# Patient Record
Sex: MALE | Race: WHITE | URBAN - METROPOLITAN AREA
[De-identification: names, ages, dates, MRNs, and addresses within clinical notes are randomized per-mention and may not be internally consistent; named-entity substitution may affect disease eponyms.]

---

## 2017-04-18 ENCOUNTER — HOSPITAL ENCOUNTER (OUTPATIENT)
Dept: VASCULAR LAB | Age: 32
Discharge: HOME | End: 2017-04-18
Attending: NURSE PRACTITIONER | Admitting: NURSE PRACTITIONER

## 2017-04-18 DIAGNOSIS — M79.661 PAIN OF RIGHT LOWER LEG: ICD-10-CM

## 2017-10-02 ENCOUNTER — OFFICE VISIT (OUTPATIENT)
Dept: URGENT CARE | Age: 32
End: 2017-10-02

## 2017-10-02 VITALS
HEART RATE: 70 BPM | SYSTOLIC BLOOD PRESSURE: 122 MMHG | DIASTOLIC BLOOD PRESSURE: 90 MMHG | TEMPERATURE: 98.8 F | HEIGHT: 71 IN | OXYGEN SATURATION: 98 % | RESPIRATION RATE: 14 BRPM | BODY MASS INDEX: 34.3 KG/M2 | WEIGHT: 245 LBS

## 2017-10-02 DIAGNOSIS — R07.9 CHEST PAIN, UNSPECIFIED TYPE: Primary | ICD-10-CM

## 2017-10-02 PROCEDURE — 99214 OFFICE O/P EST MOD 30 MIN: CPT | Performed by: EMERGENCY MEDICINE

## 2017-10-02 ASSESSMENT — ENCOUNTER SYMPTOMS
DIARRHEA: 0
WHEEZING: 0
COUGH: 0
SHORTNESS OF BREATH: 0
ABDOMINAL PAIN: 0
RHINORRHEA: 0
TROUBLE SWALLOWING: 0
HEMOPTYSIS: 0
SORE THROAT: 0
EYES NEGATIVE: 1
EYE PAIN: 0
BACK PAIN: 0
NAUSEA: 1
VOMITING: 0
EYE DISCHARGE: 0

## 2017-10-02 NOTE — PATIENT INSTRUCTIONS
Go to the Emergency Department at [facility] for further evaluation and treatment

## 2017-10-02 NOTE — MR AVS SNAPSHOT
After Visit Summary             Rufus Montero   10/2/2017 1:05 PM   Office Visit    Description:  Male : 1985   Provider:  Deisy Mcfadden MD   Department:  [facility]              Your Follow-Up and Future Appointments         Below is a list of your follow-up and future appointments. This may not be a complete list as you may have made appointments directly with providers that we are not aware of or your providers may have made some for you. Please call your providers to confirm appointments. It is important to keep your appointments. Please bring your current insurance card, photo ID, co-pay, and all medication bottles to your appointment. If self-pay, payment is expected at the time of service. Information from Your Visit        Department     Name Address Phone Fax    [facility] [address] [address],[address] [phone]      You Were Seen for:         Comments    Chest pain, unspecified type   [[id number]]         Vital Signs     Blood Pressure Pulse Temperature Respirations Height Weight    122/90 (Site: Left Arm, Position: Sitting, Cuff Size: Small Adult) 70 98.8 °F (37.1 °C) (Oral) 14 5' 11\" (1.803 m) 245 lb (111.1 kg)    Oxygen Saturation Body Mass Index Smoking Status             98% 34.17 kg/m2 Former Smoker         Additional Information about your Body Mass Index (BMI)           Your BMI as listed above is considered obese (30 or more). BMI is an estimate of body fat, calculated from your height and weight. The higher your BMI, the greater your risk of heart disease, high blood pressure, type 2 diabetes, stroke, gallstones, arthritis, sleep apnea, and certain cancers. BMI is not perfect. It may overestimate body fat in athletes and people who are more muscular.   Even a small weight loss (between 5 and 10 percent of your current weight) by decreasing your calorie intake and becoming more physically active will help lower your risk of developing or worsening diseases associated with obesity. Learn more at: [email or web address].uk          Instructions    Go to the Emergency Department at [facility] for further evaluation and treatment            Today's Medication Changes          These changes are accurate as of: 10/2/17  1:30 PM.  If you have any questions, ask your nurse or doctor. CHANGE how you take these medications           * apixaban 5 MG Tabs tablet   Commonly known as:  ELIQUIS   Instructions: Take 5 mg by mouth   Refills:  0   What changed:  Another medication with the same name was changed. Make sure you understand how and when to take each. * apixaban 5 MG Tabs tablet   Commonly known as:  ELIQUIS   Instructions: Take 2 tablets by mouth 2 times daily Please take 10 mg twice a day for 7 days and then decrease to 5 mg twice a day. Quantity:  60 tablet   Refills:  0   What changed:    - how much to take  - additional instructions       * Notice: This list has 2 medication(s) that are the same as other medications prescribed for you. Read the directions carefully, and ask your doctor or other care provider to review them with you.             Your Current Medications Are              carvedilol (COREG) 25 MG tablet Take 37.5 mg by mouth 2 times daily (with meals)    lisinopril (PRINIVIL;ZESTRIL) 10 MG tablet Take 10 mg by mouth daily    furosemide (LASIX) 20 MG tablet Take 20 mg by mouth daily    potassium chloride (KLOR-CON M) 20 MEQ extended release tablet Take 20 mEq by mouth 2 times daily    sertraline (ZOLOFT) 100 MG tablet Take 200 mg by mouth    apixaban (ELIQUIS) 5 MG TABS tablet Take 5 mg by mouth    sertraline (ZOLOFT) 25 MG tablet Take 250 mg by mouth    apixaban (ELIQUIS) 5 MG TABS tablet Take 2 tablets by mouth 2 times daily Please take 10 mg twice a day for 7 days and then decrease to 5 mg twice a

## 2017-10-02 NOTE — PROGRESS NOTES
Reason for Visit:   Chief Complaint   Patient presents with    Respiratory Distress     pt. states that they may have plueresy after seeing his pulmanologist about 4 weeks ago and his chest has gotten worse      Patient History     HPI:    Chest Pain    This is a new problem. Episode onset: x 4 weeks. The problem occurs intermittently. The problem has been gradually worsening. The pain is present in the substernal region and lateral region (left). The pain is at a severity of 8/10. The quality of the pain is described as sharp. The pain does not radiate. Associated symptoms include nausea. Pertinent negatives include no abdominal pain, back pain, cough, exertional chest pressure, fever, headaches, hemoptysis, irregular heartbeat, leg pain, palpitations, shortness of breath, syncope or vomiting. The pain is aggravated by nothing. Treatments tried: Prednisone, NSAIDS. The treatment provided no relief. Risk factors: history of PE. Reports adherence to medication regimen, including Eliquis. Past Medical History:   Diagnosis Date    ADHD (attention deficit hyperactivity disorder)     CHF [facility]congestive heart failure) ([facility])     Congenital factor IX disorder (HCC)     Depression     OCD (obsessive compulsive disorder)     PTSD (post-traumatic stress disorder)        Past Surgical History:   Procedure Laterality Date    CARPAL TUNNEL RELEASE      right    WRIST SURGERY      ganglion cyst removal       Allergies: No Known Allergies      Review of Systems       Review of Systems   Constitutional: Negative for chills and fever. HENT: Negative. Negative for ear pain, rhinorrhea, sore throat and trouble swallowing. Eyes: Negative. Negative for pain, discharge and visual disturbance. Respiratory: Negative for cough, hemoptysis, shortness of breath and wheezing. Cardiovascular: Positive for chest pain.  Negative for palpitations, leg swelling and syncope. Gastrointestinal: Positive for nausea. Negative for abdominal pain, diarrhea and vomiting. Musculoskeletal: Negative for back pain and myalgias. Skin: Negative for rash. Neurological: Negative for headaches. Physical Exam     Vitals:    10/02/17 1305   BP: (!) 122/90   Pulse: 70   Resp: 14   Temp: 98.8 °F (37.1 °C)   SpO2: 98%       Physical Exam   Constitutional: He is oriented to person, place, and time. He appears well-developed and well-nourished. No distress. HENT:   Head: Normocephalic and atraumatic. Right Ear: Tympanic membrane, external ear and ear canal normal.   Left Ear: Tympanic membrane, external ear and ear canal normal.   Nose: Nose normal.   Mouth/Throat: Uvula is midline, oropharynx is clear and moist and mucous membranes are normal. No oropharyngeal exudate, posterior oropharyngeal edema, posterior oropharyngeal erythema or tonsillar abscesses. Eyes: Conjunctivae and EOM are normal. Pupils are equal, round, and reactive to light. No scleral icterus. Neck: Normal range of motion. Neck supple. No rigidity. No tracheal deviation present. No thyromegaly present. Cardiovascular: Normal rate, regular rhythm and normal heart sounds. Exam reveals no gallop and no friction rub. No murmur heard. Pulmonary/Chest: Effort normal and breath sounds normal. No accessory muscle usage. No respiratory distress. He has no decreased breath sounds. He has no wheezes. He has no rhonchi. He has no rales. He exhibits no tenderness. Abdominal: Soft. Bowel sounds are normal. He exhibits no distension and no mass. There is no hepatosplenomegaly. There is no tenderness. There is no rebound and no guarding. Musculoskeletal: Normal range of motion. He exhibits no edema or tenderness. Right lower leg: He exhibits no tenderness and no edema. Left lower leg: He exhibits no tenderness and no edema. Lymphadenopathy:     He has no cervical adenopathy.    Neurological: He is

## 2017-11-03 ENCOUNTER — OFFICE VISIT (OUTPATIENT)
Dept: URGENT CARE | Age: 32
End: 2017-11-03

## 2017-11-03 VITALS
RESPIRATION RATE: 18 BRPM | WEIGHT: 235 LBS | SYSTOLIC BLOOD PRESSURE: 112 MMHG | TEMPERATURE: 98.9 F | HEART RATE: 87 BPM | DIASTOLIC BLOOD PRESSURE: 70 MMHG | BODY MASS INDEX: 32.9 KG/M2 | HEIGHT: 71 IN | OXYGEN SATURATION: 99 %

## 2017-11-03 DIAGNOSIS — R10.12 LEFT UPPER QUADRANT PAIN: Primary | ICD-10-CM

## 2017-11-03 PROCEDURE — 99214 OFFICE O/P EST MOD 30 MIN: CPT | Performed by: EMERGENCY MEDICINE

## 2017-11-03 PROCEDURE — G8417 CALC BMI ABV UP PARAM F/U: HCPCS | Performed by: EMERGENCY MEDICINE

## 2017-11-03 PROCEDURE — 1036F TOBACCO NON-USER: CPT | Performed by: EMERGENCY MEDICINE

## 2017-11-03 PROCEDURE — G8427 DOCREV CUR MEDS BY ELIG CLIN: HCPCS | Performed by: EMERGENCY MEDICINE

## 2017-11-03 PROCEDURE — G8484 FLU IMMUNIZE NO ADMIN: HCPCS | Performed by: EMERGENCY MEDICINE

## 2017-11-03 ASSESSMENT — ENCOUNTER SYMPTOMS
DIARRHEA: 0
NAUSEA: 1
VOMITING: 0
ABDOMINAL PAIN: 1
ABDOMINAL DISTENTION: 0

## 2017-11-03 NOTE — PATIENT INSTRUCTIONS
Please go directly to the emergency department for evaluation. Patient Education        Abdominal Pain: Care Instructions  Your Care Instructions    Abdominal pain has many possible causes. Some aren't serious and get better on their own in a few days. Others need more testing and treatment. If your pain continues or gets worse, you need to be rechecked and may need more tests to find out what is wrong. You may need surgery to correct the problem. Don't ignore new symptoms, such as fever, nausea and vomiting, urination problems, pain that gets worse, and dizziness. These may be signs of a more serious problem. Your doctor may have recommended a follow-up visit in the next 8 to 12 hours. If you are not getting better, you may need more tests or treatment. The doctor has checked you carefully, but problems can develop later. If you notice any problems or new symptoms, get medical treatment right away. Follow-up care is a key part of your treatment and safety. Be sure to make and go to all appointments, and call your doctor if you are having problems. It's also a good idea to know your test results and keep a list of the medicines you take. How can you care for yourself at home? · Rest until you feel better. · To prevent dehydration, drink plenty of fluids, enough so that your urine is light yellow or clear like water. Choose water and other caffeine-free clear liquids until you feel better. If you have kidney, heart, or liver disease and have to limit fluids, talk with your doctor before you increase the amount of fluids you drink. · If your stomach is upset, eat mild foods, such as rice, dry toast or crackers, bananas, and applesauce. Try eating several small meals instead of two or three large ones. · Wait until 48 hours after all symptoms have gone away before you have spicy foods, alcohol, and drinks that contain caffeine. · Do not eat foods that are high in fat.   · Avoid anti-inflammatory medicines such as aspirin, ibuprofen (Advil, Motrin), and naproxen (Aleve). These can cause stomach upset. Talk to your doctor if you take daily aspirin for another health problem. When should you call for help? Call 911 anytime you think you may need emergency care. For example, call if:  · You passed out (lost consciousness). · You pass maroon or very bloody stools. · You vomit blood or what looks like coffee grounds. · You have new, severe belly pain. Call your doctor now or seek immediate medical care if:  · Your pain gets worse, especially if it becomes focused in one area of your belly. · You have a new or higher fever. · Your stools are black and look like tar, or they have streaks of blood. · You have unexpected vaginal bleeding. · You have symptoms of a urinary tract infection. These may include:  ¨ Pain when you urinate. ¨ Urinating more often than usual.  ¨ Blood in your urine. · You are dizzy or lightheaded, or you feel like you may faint. Watch closely for changes in your health, and be sure to contact your doctor if:  · You are not getting better after 1 day (24 hours). Where can you learn more? Go to https://[employer].Sionex. org and sign in to your [email or web address] account. Enter G492 in the gBox box to learn more about \"Abdominal Pain: Care Instructions. \"     If you do not have an account, please click on the \"Sign Up Now\" link. Current as of: March 20, 2017  Content Version: 11.3  © 7063-7997 [employer]. Care instructions adapted under license by [facility] ([facility]). If you have questions about a medical condition or this instruction, always ask your healthcare professional. [place] [address] any warranty or liability for your use of this information.

## 2017-11-03 NOTE — PROGRESS NOTES
Subjective:      Patient ID: Collette Push is a 34 y.o. male. Severe LUQ abdominal pain, never had a pain like this before. Abdominal Pain   This is a new problem. Episode onset: 4 days ago. The onset quality is gradual. The problem occurs constantly. The problem has been waxing and waning. The pain is located in the LLQ. The pain is at a severity of 8/10. The pain is severe. The quality of the pain is sharp and burning. The abdominal pain does not radiate. Associated symptoms include nausea. Pertinent negatives include no diarrhea, dysuria, fever, frequency or vomiting. Nothing aggravates the pain. The pain is relieved by nothing. Treatments tried: ibuprofen. The treatment provided no relief. Review of Systems   Constitutional: Negative for fever. Gastrointestinal: Positive for abdominal pain and nausea. Negative for abdominal distention, diarrhea and vomiting. Genitourinary: Negative for dysuria and frequency. All other systems reviewed and are negative. Objective:   Physical Exam   Constitutional: He is oriented to person, place, and time. He appears well-developed and well-nourished. No distress. HENT:   Head: Normocephalic and atraumatic. Right Ear: External ear normal.   Left Ear: External ear normal.   Nose: Nose normal.   Mouth/Throat: Oropharynx is clear and moist.   Eyes: Conjunctivae and EOM are normal. Pupils are equal, round, and reactive to light. Neck: Normal range of motion. Neck supple. Cardiovascular: Normal rate, regular rhythm, normal heart sounds and intact distal pulses. Pulmonary/Chest: Effort normal and breath sounds normal.   Abdominal: Soft. Bowel sounds are normal. He exhibits no distension and no mass. There is tenderness in the left upper quadrant. There is no rigidity, no rebound and no guarding. Musculoskeletal: Normal range of motion. Neurological: He is alert and oriented to person, place, and time. He has normal reflexes.    Skin: Skin is

## 2020-02-14 RX ORDER — FUROSEMIDE 40 MG/1
TABLET ORAL
Qty: 30 TABLET | Refills: 5 | OUTPATIENT
Start: 2020-02-14

## 2025-02-24 ENCOUNTER — TELEPHONE (OUTPATIENT)
Dept: PHARMACY | Age: 40
End: 2025-02-24

## 2025-02-24 NOTE — TELEPHONE ENCOUNTER
Patient contacted clinic to schedule appointment.     Patient informed that we would need Anticoagulation Service Referral Form and [facility] Consult Agreement signed by his referring provider.     Patient stated to send documents to Dr. Zoe Andres, DO    Both documents were faxed to provider (fax #: [phone])    Jerman Becker PharmD 2/24/2025 3:33 PM

## 2025-02-26 NOTE — TELEPHONE ENCOUNTER
2/26/2025    Anticoagulation referral form received from provider. Called provider's office who stated the [place] [facility] consult agreement was supposed to be sent over as well. Provider's office stated they would fax document over today.    Contacted patient to schedule appointment  Patient stated he started warfarin 5mg daily on 2/25/25  Appointment scheduled for 2/28/2025 at 15:00    Patient given clinic address and phone number    Jerman Becker PharmD 2/26/2025 12:38 PM    Referral Provider: Dr. Zoe Sarmiento,   Referral Date: 2/24/2025

## 2025-02-28 ENCOUNTER — ANTI-COAG VISIT (OUTPATIENT)
Dept: PHARMACY | Age: 40
End: 2025-02-28

## 2025-02-28 DIAGNOSIS — D68.2 FACTOR II DEFICIENCY (HCC): Primary | ICD-10-CM

## 2025-02-28 LAB
INTERNATIONAL NORMALIZATION RATIO, POC: 1.4
PROTHROMBIN TIME, POC: 0

## 2025-02-28 PROCEDURE — 99202 OFFICE O/P NEW SF 15 MIN: CPT

## 2025-02-28 PROCEDURE — 85610 PROTHROMBIN TIME: CPT

## 2025-02-28 RX ORDER — METOPROLOL TARTRATE 100 MG/1
100 TABLET ORAL 2 TIMES DAILY
COMMUNITY

## 2025-02-28 RX ORDER — TRAZODONE HYDROCHLORIDE 50 MG/1
50 TABLET ORAL NIGHTLY
COMMUNITY

## 2025-02-28 RX ORDER — WARFARIN SODIUM 5 MG/1
5 TABLET ORAL DAILY
COMMUNITY

## 2025-02-28 RX ORDER — SERTRALINE HYDROCHLORIDE 100 MG/1
100 TABLET, FILM COATED ORAL DAILY
COMMUNITY

## 2025-02-28 NOTE — PROGRESS NOTES
Mr. Kendall Hidalgo is a 39 y.o. y/o male with history of factor II deficiency who presents today for anticoagulation monitoring and adjustment. Patient was previously on apixaban, but has switched to warfarin due to losing insurance.    Pertinent PMH:    Patient Reported Findings:  Yes     No  [x]   []       Patient verifies current dosing regimen as listed  - Patient has been taking warfarin 5 mg daily since 24  - Warfarin 5 mg tablets  []   [x]       S/Sx bleeding/bruising/swelling/SOB/CP  []   [x]       Blood in urine or stool  []   [x]       Procedures scheduled in the future at this time  []   [x]       Missed Dose  []   [x]       Extra Dose  []   [x]       Change in medications  []   [x]       Change in health/diet/appetite  []   [x]       Change in alcohol use  []   [x]       Change in activity  []   [x]       Hospital admission  []   [x]       Emergency department visit  [x]   []       Other complaints  - Patient is hoping to transition back to apixaban once he has insurance again.    Clinical Outcomes:  Yes     No  []   [x]       Major bleeding event  []   [x]       Thromboembolic event    INR (no units)   Date Value   10/09/2017 1.13   2017 1.21 (H)   2017 1.14   2017 1.04       Duration of warfarin Therapy: Indefinite  INR Range:  2-3    As initial clinic visit, provided pt with counseling for medication use/compliance, adverse events, and nutrition; clinic overview & orientation; and educational materials.    INR 1.4 is below therapeutic range of 2-3  Recommend take 10 mg today then 5 mg on 3/1/25 and 3/2/25  Will continue to monitor and check INR in 3 days  AVS printed and reviewed with patient   Return to clinic: 3/3/2025    Referral Provider: Dr. Zoe Sarmiento  Referral Date: 2025  CPA: Signed    Jerman Becker PharmD 2025 1:30 PM    For Pharmacy Admin Tracking Only    Intervention Detail: Adherence Monitorin and Dose Adjustment: 1, reason: Therapy

## 2025-03-03 ENCOUNTER — ANTI-COAG VISIT (OUTPATIENT)
Dept: PHARMACY | Age: 40
End: 2025-03-03
Payer: MEDICAID

## 2025-03-03 DIAGNOSIS — D68.2 FACTOR II DEFICIENCY (HCC): Primary | ICD-10-CM

## 2025-03-03 LAB
INTERNATIONAL NORMALIZATION RATIO, POC: 1.8
PROTHROMBIN TIME, POC: 0

## 2025-03-03 PROCEDURE — 99211 OFF/OP EST MAY X REQ PHY/QHP: CPT

## 2025-03-03 PROCEDURE — 85610 PROTHROMBIN TIME: CPT

## 2025-03-03 NOTE — PROGRESS NOTES
Mr. Kendall Hidalgo is a 39 y.o. y/o male with history of factor II deficiency who presents today for anticoagulation monitoring and adjustment. Patient was previously on apixaban, but has switched to warfarin due to losing insurance.    Pertinent PMH: DVT, PE    Patient Reported Findings:  Yes     No  [x]   []       Patient verifies current dosing regimen as listed  - Patient has been taking warfarin 5 mg daily since 24  - Warfarin 5 mg tablets  []   [x]       S/Sx bleeding/bruising/swelling/SOB/CP  []   [x]       Blood in urine or stool  []   [x]       Procedures scheduled in the future at this time  []   [x]       Missed Dose  []   [x]       Extra Dose  []   [x]       Change in medications  []   [x]       Change in health/diet/appetite  []   [x]       Change in alcohol use  []   [x]       Change in activity  []   [x]       Hospital admission  []   [x]       Emergency department visit  [x]   []       Other complaints  - Patient is hoping to transition back to apixaban once he has insurance again.    Clinical Outcomes:  Yes     No  []   [x]       Major bleeding event  []   [x]       Thromboembolic event    INR (no units)   Date Value   10/09/2017 1.13   2017 1.21 (H)   2017 1.14   2017 1.04     INR,(POC) (no units)   Date Value   2025 1.4       Duration of warfarin Therapy: Indefinite  INR Range:  2-3        INR 1.8 is below therapeutic range of 2-3  INR remains subtherapeutic; however, likely have not seen the full effects of 10 mg on 25   Recommend take 7.5 mg today then 5 mg all other days   Will continue to monitor and check INR in 4 days  AVS printed and reviewed with patient   Return to clinic: 3/7/2025    Referral Provider: Dr. Zoe Sarmiento  Referral Date: 2025  CPA: Signed    Jerman Becker, PharmD 3/3/2025 8:23 AM      For Pharmacy Admin Tracking Only  Intervention Detail: Adherence Monitorin and Dose Adjustment: 1, reason: Therapy Optimization  Total # of

## 2025-03-07 ENCOUNTER — ANTI-COAG VISIT (OUTPATIENT)
Dept: PHARMACY | Age: 40
End: 2025-03-07
Payer: MEDICAID

## 2025-03-07 DIAGNOSIS — D68.2 FACTOR II DEFICIENCY (HCC): Primary | ICD-10-CM

## 2025-03-07 LAB
INTERNATIONAL NORMALIZATION RATIO, POC: 2.3
PROTHROMBIN TIME, POC: 0

## 2025-03-07 PROCEDURE — 99211 OFF/OP EST MAY X REQ PHY/QHP: CPT

## 2025-03-07 PROCEDURE — 85610 PROTHROMBIN TIME: CPT

## 2025-03-07 NOTE — PROGRESS NOTES
Mr. Kendall Hidalgo is a 39 y.o. y/o male with history of factor II deficiency who presents today for anticoagulation monitoring and adjustment. Patient was previously on apixaban, but has switched to warfarin due to losing insurance.    Pertinent PMH: DVT, PE    Patient Reported Findings:  Yes     No  [x]   []       Patient verifies current dosing regimen as listed  - Warfarin 5 mg tablets  []   [x]       S/Sx bleeding/bruising/swelling/SOB/CP  []   [x]       Blood in urine or stool  []   [x]       Procedures scheduled in the future at this time  []   [x]       Missed Dose  []   [x]       Extra Dose  []   [x]       Change in medications  []   [x]       Change in health/diet/appetite  []   [x]       Change in alcohol use  []   [x]       Change in activity  []   [x]       Hospital admission  []   [x]       Emergency department visit  [x]   []       Other complaints  - Patient is hoping to transition back to apixaban once he has insurance again.    Clinical Outcomes:  Yes     No  []   [x]       Major bleeding event  []   [x]       Thromboembolic event    INR (no units)   Date Value   10/09/2017 1.13   2017 1.21 (H)   2017 1.14   2017 1.04     INR,(POC) (no units)   Date Value   2025 1.8   2025 1.4     Duration of warfarin Therapy: Indefinite  INR Range:  2-3        INR 2.3 is WITHIN therapeutic range of 2-3  Recommend take 7.5 mg every Mon, Wed, Fri; 5 mg all other days   Will continue to monitor and check INR in 1 week  AVS printed and reviewed with patient   Return to clinic: 3/12/2025    Referral Provider: Dr. Zoe Sarmiento  Referral Date: 2025  CPA: Signed    Jreman Becker, PharmD 3/7/2025 6:48 AM        For Pharmacy Admin Tracking Only  Intervention Detail: Adherence Monitorin and Dose Adjustment: 1, reason: Therapy Optimization  Total # of Interventions Recommended: 2  Total # of Interventions Accepted: 2  Time Spent (min): 15

## 2025-03-12 ENCOUNTER — ANTI-COAG VISIT (OUTPATIENT)
Dept: PHARMACY | Age: 40
End: 2025-03-12
Payer: MEDICAID

## 2025-03-12 DIAGNOSIS — D68.2 FACTOR II DEFICIENCY (HCC): Primary | ICD-10-CM

## 2025-03-12 LAB
INTERNATIONAL NORMALIZATION RATIO, POC: 2.7
PROTHROMBIN TIME, POC: 0

## 2025-03-12 PROCEDURE — 85610 PROTHROMBIN TIME: CPT

## 2025-03-12 PROCEDURE — 99211 OFF/OP EST MAY X REQ PHY/QHP: CPT

## 2025-03-12 NOTE — PROGRESS NOTES
Mr. Kendall Hidalgo is a 39 y.o. y/o male with history of factor II deficiency who presents today for anticoagulation monitoring and adjustment. Patient was previously on apixaban, but has switched to warfarin due to losing insurance.    Pertinent PMH: DVT, PE    Patient Reported Findings:  Yes     No  [x]   []       Patient verifies current dosing regimen as listed  - Warfarin 7.5 mg every Mon, Wed, Fri; 5 mg all other days   - Warfarin 5 mg tablets  []   [x]       S/Sx bleeding/bruising/swelling/SOB/CP  []   [x]       Blood in urine or stool  []   [x]       Procedures scheduled in the future at this time  []   [x]       Missed Dose  []   [x]       Extra Dose  []   [x]       Change in medications  []   [x]       Change in health/diet/appetite  []   [x]       Change in alcohol use  []   [x]       Change in activity  []   [x]       Hospital admission  []   [x]       Emergency department visit  [x]   []       Other complaints  - Patient reports needing new Rx of warfarin  - Patient is hoping to transition back to apixaban once he has insurance again.      Clinical Outcomes:  Yes     No  []   [x]       Major bleeding event  []   [x]       Thromboembolic event    INR (no units)   Date Value   10/09/2017 1.13   09/26/2017 1.21 (H)   05/02/2017 1.14   04/17/2017 1.04     INR,(POC) (no units)   Date Value   03/07/2025 2.3   03/03/2025 1.8   02/28/2025 1.4     Duration of warfarin Therapy: Indefinite  INR Range:  2-3      INR 2.7 is WITHIN therapeutic range of 2-3  Recommend take 7.5 mg every Mon, Wed, Fri; 5 mg all other days   Will continue to monitor and check INR in 2 weeks  AVS printed and reviewed with patient   Return to clinic: 3/26/2025    Warfarin prescription phoned into [facility] (#[id number])  under Dr. Zoe Sarmiento (per referral agreement)  Dr. Zoe Sarmiento  (NPI:  [id number] Office: [phone])  Warfarin 5 mg tabs  Take 7.5 mg every Mon, Wed, Fri;5 mg all other days   Qty: 28 days  Refills: 2      Referral

## 2025-03-26 ENCOUNTER — ANTI-COAG VISIT (OUTPATIENT)
Dept: PHARMACY | Age: 40
End: 2025-03-26

## 2025-03-26 DIAGNOSIS — D68.2 FACTOR II DEFICIENCY (HCC): Primary | ICD-10-CM

## 2025-03-26 LAB
INTERNATIONAL NORMALIZATION RATIO, POC: 3.3
PROTHROMBIN TIME, POC: 0

## 2025-03-26 PROCEDURE — 99211 OFF/OP EST MAY X REQ PHY/QHP: CPT

## 2025-03-26 PROCEDURE — 85610 PROTHROMBIN TIME: CPT

## 2025-03-26 NOTE — PROGRESS NOTES
Mr. Kendall Hidalgo is a 39 y.o. y/o male with history of factor II deficiency who presents today for anticoagulation monitoring and adjustment. Patient was previously on apixaban, but has switched to warfarin due to losing insurance.    Pertinent PMH: DVT, PE    Patient Reported Findings:  Yes     No  [x]   []       Patient verifies current dosing regimen as listed  - Warfarin 7.5 mg every Mon, Wed, Fri; 5 mg all other days   - Warfarin 5 mg tablets  []   [x]       S/Sx bleeding/bruising/swelling/SOB/CP  []   [x]       Blood in urine or stool  []   [x]       Procedures scheduled in the future at this time  []   [x]       Missed Dose  []   [x]       Extra Dose  []   [x]       Change in medications  []   [x]       Change in health/diet/appetite  - Patient reports slightly less vitamin k in diet the last few weeks  - Patient reports some diarrhea recently   []   [x]       Change in alcohol use  []   [x]       Change in activity  []   [x]       Hospital admission  []   [x]       Emergency department visit  [x]   []       Other complaints  - Patient is hoping to transition back to apixaban once he has insurance again.  - Patient reports recently starting new job      Clinical Outcomes:  Yes     No  []   [x]       Major bleeding event  []   [x]       Thromboembolic event    INR (no units)   Date Value   10/09/2017 1.13   09/26/2017 1.21 (H)   05/02/2017 1.14   04/17/2017 1.04     INR,(POC) (no units)   Date Value   03/12/2025 2.7   03/07/2025 2.3   03/03/2025 1.8   02/28/2025 1.4     Duration of warfarin Therapy: Indefinite  INR Range:  2-3      INR 3.3 is ABOVE therapeutic range of 2-3  Recommend take take 5 mg today then continue 7.5 mg every Mon, Wed, Fri; 5 mg all other days (5.9% reduction)  Will continue to monitor and check INR in 2 weeks  AVS printed and reviewed with patient   Return to clinic: 4/9/2025    Referral Provider: Dr. Zoe Sarmiento  Referral Date: 2/28/2025  CPA: Signed    Jerman Becker PharmD

## 2025-04-07 ENCOUNTER — ANTI-COAG VISIT (OUTPATIENT)
Dept: PHARMACY | Age: 40
End: 2025-04-07

## 2025-04-07 DIAGNOSIS — D68.2 FACTOR II DEFICIENCY (HCC): Primary | ICD-10-CM

## 2025-04-07 PROCEDURE — 99211 OFF/OP EST MAY X REQ PHY/QHP: CPT

## 2025-04-07 PROCEDURE — 85610 PROTHROMBIN TIME: CPT

## 2025-04-07 NOTE — PROGRESS NOTES
Mr. Kendall Hidalgo is a 39 y.o. y/o male with history of factor II deficiency who presents today for anticoagulation monitoring and adjustment. Patient was previously on apixaban, but has switched to warfarin due to losing insurance.    Pertinent PMH: DVT, PE    Patient Reported Findings:  Yes     No  [x]   []       Patient verifies current dosing regimen as listed  - Warfarin 7.5 mg every Mon, Wed, Fri; 5 mg all other days   - Warfarin 5 mg tablets  []   [x]       S/Sx bleeding/bruising/swelling/SOB/CP  []   [x]       Blood in urine or stool  []   [x]       Procedures scheduled in the future at this time  [x]   []       Missed Dose  - Last dose of warfarin on 4/4/2025  []   [x]       Extra Dose  []   [x]       Change in medications  []   [x]       Change in health/diet/appetite  - Patient reports slightly less vitamin k in diet the last few weeks  - Patient reports some diarrhea recently   []   [x]       Change in alcohol use  []   [x]       Change in activity  []   [x]       Hospital admission  []   [x]       Emergency department visit  [x]   []       Other complaints  - Patient is hoping to transition back to apixaban once he has insurance again.  - Patient reports recently starting new job      Clinical Outcomes:  Yes     No  []   [x]       Major bleeding event  []   [x]       Thromboembolic event    INR (no units)   Date Value   10/09/2017 1.13   09/26/2017 1.21 (H)   05/02/2017 1.14   04/17/2017 1.04     INR,(POC) (no units)   Date Value   03/26/2025 3.3   03/12/2025 2.7   03/07/2025 2.3   03/03/2025 1.8     Duration of warfarin Therapy: Indefinite  INR Range:  2-3      INR 1.1  Patient to start taking apixaban now that INR is less than 2  Patient to contact clinic PRN  Patient did not want AVS  Return to clinic: N/A - patient transitioning to apixaban      Referral Provider: Dr. Zoe Sarmiento  Referral Date: 2/28/2025  CPA: Signed    Jerman Becker PharmD 4/7/2025 11:33 AM          For Pharmacy Admin